# Patient Record
Sex: FEMALE | Race: WHITE | NOT HISPANIC OR LATINO
[De-identification: names, ages, dates, MRNs, and addresses within clinical notes are randomized per-mention and may not be internally consistent; named-entity substitution may affect disease eponyms.]

---

## 2022-10-29 ENCOUNTER — APPOINTMENT (OUTPATIENT)
Dept: ORTHOPEDIC SURGERY | Facility: CLINIC | Age: 52
End: 2022-10-29

## 2022-10-29 VITALS — WEIGHT: 138 LBS | BODY MASS INDEX: 23.56 KG/M2 | HEIGHT: 64 IN

## 2022-10-29 DIAGNOSIS — Z78.9 OTHER SPECIFIED HEALTH STATUS: ICD-10-CM

## 2022-10-29 DIAGNOSIS — D17.1 BENIGN LIPOMATOUS NEOPLASM OF SKIN AND SUBCUTANEOUS TISSUE OF TRUNK: ICD-10-CM

## 2022-10-29 PROBLEM — Z00.00 ENCOUNTER FOR PREVENTIVE HEALTH EXAMINATION: Status: ACTIVE | Noted: 2022-10-29

## 2022-10-29 PROCEDURE — 99203 OFFICE O/P NEW LOW 30 MIN: CPT

## 2022-10-29 NOTE — PHYSICAL EXAM
[Normal Coordination] : normal coordination [Normal DTR UE/LE] : normal DTR UE/LE  [Normal Sensation] : normal sensation [Normal Mood and Affect] : normal mood and affect [Orientated] : orientated [Able to Communicate] : able to communicate [Normal Skin] : normal skin [No Rash] : no rash [No Ulcers] : no ulcers [No Lesions] : no lesions [No obvious lymphadenopathy in areas examined] : no obvious lymphadenopathy in areas examined [Well Developed] : well developed [Well Nourished] : well nourished [Peripheral vascular exam is grossly normal] : peripheral vascular exam is grossly normal [No Respiratory Distress] : no respiratory distress [] : negative sitting straight leg raise

## 2022-10-29 NOTE — DISCUSSION/SUMMARY
[de-identified] : Patient with small, nontender subcutaneous mass to left of midline at thoracolumbar area consistent with lipoma. Recommended observation with repeat examination in 3-4 months. Counseled patient to look for any signs of pain, tenderness or enlargening of the mass which would prompt diagnostic imaging/biopsy.

## 2022-10-29 NOTE — HISTORY OF PRESENT ILLNESS
[0] : 0 [Radiating] : radiating [Tightness] : tightness [Full time] : Work status: full time [de-identified] : Patient is a 51 y/o woman who presents for evaluation of a chief complaint of a lump on her back. Notes a 3 week h/o a small bump in the middle back. Denies trauma. Denies pain or tenderness. Notices bump when sitting in a high backed chair. Reports no recent constitutional symptoms. [] : no [FreeTextEntry1] : L-spine [de-identified] : m [FreeTextEntry5] : Patient states to feel a lump on the L-spine. [de-identified] : Massage

## 2023-01-30 ENCOUNTER — APPOINTMENT (OUTPATIENT)
Dept: ORTHOPEDIC SURGERY | Facility: CLINIC | Age: 53
End: 2023-01-30

## 2023-05-02 ENCOUNTER — OFFICE (OUTPATIENT)
Dept: URBAN - METROPOLITAN AREA CLINIC 113 | Facility: CLINIC | Age: 53
Setting detail: OPHTHALMOLOGY
End: 2023-05-02
Payer: COMMERCIAL

## 2023-05-02 DIAGNOSIS — H52.7: ICD-10-CM

## 2023-05-02 DIAGNOSIS — D31.30: ICD-10-CM

## 2023-05-02 PROCEDURE — 92015 DETERMINE REFRACTIVE STATE: CPT | Performed by: STUDENT IN AN ORGANIZED HEALTH CARE EDUCATION/TRAINING PROGRAM

## 2023-05-02 PROCEDURE — 99212 OFFICE O/P EST SF 10 MIN: CPT | Performed by: STUDENT IN AN ORGANIZED HEALTH CARE EDUCATION/TRAINING PROGRAM

## 2023-05-02 ASSESSMENT — SPHEQUIV_DERIVED
OD_SPHEQUIV: 0.5
OD_SPHEQUIV: 0.5
OS_SPHEQUIV: 0.375
OD_SPHEQUIV: 0.5

## 2023-05-02 ASSESSMENT — REFRACTION_AUTOREFRACTION
OS_SPHERE: +0.50
OD_AXIS: 119
OD_SPHERE: +0.75
OD_CYLINDER: -0.50
OS_CYLINDER: -0.25
OS_AXIS: 059

## 2023-05-02 ASSESSMENT — REFRACTION_MANIFEST
OD_CYLINDER: -0.50
OS_VA1: 20/20
OS_SPHERE: +0.50
OS_VA1: 20/20
OS_SPHERE: +0.50
OD_VA2: 20/20
OD_VA1: 20/20-
OD_CYLINDER: -0.50
OS_ADD: +1.75
OD_SPHERE: +0.75
OD_AXIS: 119
OS_VA2: 20/20
OS_CYLINDER: -0.25
OD_VA2: 20/20
OD_VA1: 20/20-
OS_CYLINDER: -0.25
OS_VA2: 20/20
OD_AXIS: 119
OS_AXIS: 059
OD_SPHERE: +0.75
OS_AXIS: 059
OD_ADD: +1.75

## 2023-05-02 ASSESSMENT — CONFRONTATIONAL VISUAL FIELD TEST (CVF)
OD_FINDINGS: FULL
OS_FINDINGS: FULL

## 2023-05-02 ASSESSMENT — REFRACTION_CURRENTRX
OD_CYLINDER: -0.25
OS_SPHERE: +1.50
OD_OVR_VA: 20/
OS_OVR_VA: 20/
OD_AXIS: 116
OD_SPHERE: +1.50
OS_AXIS: 051
OS_CYLINDER: -0.25

## 2023-05-02 ASSESSMENT — AXIALLENGTH_DERIVED
OD_AL: 22.9122
OS_AL: 22.9582
OS_AL: 22.9582
OD_AL: 22.9122
OS_AL: 22.9582
OD_AL: 22.9122

## 2023-05-02 ASSESSMENT — KERATOMETRY
OD_K2POWER_DIOPTERS: 45.25
OS_K1POWER_DIOPTERS: 44.75
OD_K1POWER_DIOPTERS: 44.50
OD_AXISANGLE_DEGREES: 057
OS_K2POWER_DIOPTERS: 45.00
OS_AXISANGLE_DEGREES: 112

## 2023-05-02 ASSESSMENT — TONOMETRY
OS_IOP_MMHG: 14
OD_IOP_MMHG: 14

## 2023-05-02 ASSESSMENT — VISUAL ACUITY
OD_BCVA: 20/50
OS_BCVA: 20/30

## 2023-06-14 ENCOUNTER — OFFICE (OUTPATIENT)
Dept: URBAN - METROPOLITAN AREA CLINIC 113 | Facility: CLINIC | Age: 53
Setting detail: OPHTHALMOLOGY
End: 2023-06-14
Payer: COMMERCIAL

## 2023-06-14 DIAGNOSIS — D31.30: ICD-10-CM

## 2023-06-14 PROCEDURE — 92014 COMPRE OPH EXAM EST PT 1/>: CPT | Performed by: STUDENT IN AN ORGANIZED HEALTH CARE EDUCATION/TRAINING PROGRAM

## 2023-06-14 PROCEDURE — 92250 FUNDUS PHOTOGRAPHY W/I&R: CPT | Performed by: STUDENT IN AN ORGANIZED HEALTH CARE EDUCATION/TRAINING PROGRAM

## 2023-06-14 ASSESSMENT — REFRACTION_MANIFEST
OS_VA1: 20/20
OD_VA1: 20/20-
OD_ADD: +1.75
OD_VA2: 20/20
OD_SPHERE: +0.75
OD_AXIS: 119
OS_SPHERE: +0.50
OS_CYLINDER: -0.25
OS_CYLINDER: -0.25
OD_VA2: 20/20
OD_CYLINDER: -0.50
OS_ADD: +1.75
OD_CYLINDER: -0.50
OS_AXIS: 059
OD_SPHERE: +0.75
OS_VA2: 20/20
OD_VA1: 20/20-
OS_SPHERE: +0.50
OS_VA1: 20/20
OS_VA2: 20/20
OD_AXIS: 119
OS_AXIS: 059

## 2023-06-14 ASSESSMENT — KERATOMETRY
OD_K2POWER_DIOPTERS: 45.25
OS_AXISANGLE_DEGREES: 110
OS_K1POWER_DIOPTERS: 44.50
OD_AXISANGLE_DEGREES: 063
OS_K2POWER_DIOPTERS: 45.00
OD_K1POWER_DIOPTERS: 44.25

## 2023-06-14 ASSESSMENT — SPHEQUIV_DERIVED
OD_SPHEQUIV: 0.5
OS_SPHEQUIV: 0.375
OD_SPHEQUIV: 0.5
OS_SPHEQUIV: 0.875
OD_SPHEQUIV: 0.625
OS_SPHEQUIV: 0.375

## 2023-06-14 ASSESSMENT — VISUAL ACUITY
OD_BCVA: 20/30
OS_BCVA: 20/25

## 2023-06-14 ASSESSMENT — TONOMETRY
OS_IOP_MMHG: 16
OD_IOP_MMHG: 15

## 2023-06-14 ASSESSMENT — AXIALLENGTH_DERIVED
OD_AL: 22.9556
OD_AL: 22.9096
OS_AL: 23.0018
OD_AL: 22.9556
OS_AL: 22.8181
OS_AL: 23.0018

## 2023-06-14 ASSESSMENT — REFRACTION_AUTOREFRACTION
OD_CYLINDER: -0.25
OD_SPHERE: +0.75
OD_AXIS: 124
OS_CYLINDER: -0.25
OS_SPHERE: +1.00
OS_AXIS: 066

## 2023-06-14 ASSESSMENT — REFRACTION_CURRENTRX
OS_CYLINDER: -0.25
OD_CYLINDER: -0.25
OD_OVR_VA: 20/
OD_AXIS: 116
OS_AXIS: 051
OS_SPHERE: +1.50
OS_OVR_VA: 20/
OD_SPHERE: +1.50

## 2023-06-14 ASSESSMENT — CONFRONTATIONAL VISUAL FIELD TEST (CVF)
OS_FINDINGS: FULL
OD_FINDINGS: FULL

## 2025-01-13 ENCOUNTER — OFFICE (OUTPATIENT)
Dept: URBAN - METROPOLITAN AREA CLINIC 113 | Facility: CLINIC | Age: 55
Setting detail: OPHTHALMOLOGY
End: 2025-01-13
Payer: COMMERCIAL

## 2025-01-13 DIAGNOSIS — H43.393: ICD-10-CM

## 2025-01-13 DIAGNOSIS — D31.30: ICD-10-CM

## 2025-01-13 DIAGNOSIS — H52.7: ICD-10-CM

## 2025-01-13 DIAGNOSIS — H35.3121: ICD-10-CM

## 2025-01-13 PROCEDURE — 92014 COMPRE OPH EXAM EST PT 1/>: CPT | Performed by: STUDENT IN AN ORGANIZED HEALTH CARE EDUCATION/TRAINING PROGRAM

## 2025-01-13 PROCEDURE — 92015 DETERMINE REFRACTIVE STATE: CPT | Performed by: STUDENT IN AN ORGANIZED HEALTH CARE EDUCATION/TRAINING PROGRAM

## 2025-01-13 PROCEDURE — 92250 FUNDUS PHOTOGRAPHY W/I&R: CPT | Performed by: STUDENT IN AN ORGANIZED HEALTH CARE EDUCATION/TRAINING PROGRAM

## 2025-01-13 ASSESSMENT — REFRACTION_CURRENTRX
OD_SPHERE: +2.50
OS_AXIS: 064
OS_SPHERE: +2.50
OS_OVR_VA: 20/
OD_VPRISM_DIRECTION: SV
OD_AXIS: 107
OD_CYLINDER: -0.25
OS_CYLINDER: -0.25
OD_OVR_VA: 20/
OS_VPRISM_DIRECTION: SV
OD_SPHERE: +1.50
OS_SPHERE: +1.50
OD_CYLINDER: -0.25
OS_CYLINDER: -0.25
OS_OVR_VA: 20/
OS_AXIS: 051
OD_OVR_VA: 20/
OD_AXIS: 116

## 2025-01-13 ASSESSMENT — KERATOMETRY
OS_K1POWER_DIOPTERS: 44.50
OD_AXISANGLE_DEGREES: 061
OD_K1POWER_DIOPTERS: 44.50
OS_K2POWER_DIOPTERS: 45.00
OS_AXISANGLE_DEGREES: 125
OD_K2POWER_DIOPTERS: 45.25

## 2025-01-13 ASSESSMENT — REFRACTION_MANIFEST
OS_VA1: 20/20
OS_VA2: 20/20
OS_SPHERE: +0.50
OD_SPHERE: +1.00
OS_VA1: 20/20
OS_ADD: +1.75
OD_AXIS: 119
OD_CYLINDER: -0.50
OD_VA2: 20/20
OD_VA2: 20/20
OS_AXIS: 059
OD_AXIS: 119
OS_ADD: +1.75
OD_CYLINDER: -0.50
OS_AXIS: 075
OD_ADD: +1.75
OD_ADD: +1.75
OS_CYLINDER: -0.25
OD_SPHERE: +0.75
OD_VA1: 20/20
OD_VA1: 20/20-
OS_CYLINDER: -0.25
OS_SPHERE: +1.00
OS_VA2: 20/20

## 2025-01-13 ASSESSMENT — REFRACTION_AUTOREFRACTION
OD_AXIS: 119
OS_CYLINDER: -0.25
OS_SPHERE: +1.00
OS_AXIS: 075
OD_SPHERE: +1.00
OD_CYLINDER: -0.50

## 2025-01-13 ASSESSMENT — CONFRONTATIONAL VISUAL FIELD TEST (CVF)
OD_FINDINGS: FULL
OS_FINDINGS: FULL

## 2025-01-13 ASSESSMENT — TONOMETRY
OS_IOP_MMHG: 15
OD_IOP_MMHG: 16

## 2025-01-13 ASSESSMENT — VISUAL ACUITY
OD_BCVA: 20/30
OS_BCVA: 20/25

## 2025-07-21 ENCOUNTER — OFFICE (OUTPATIENT)
Dept: URBAN - METROPOLITAN AREA CLINIC 113 | Facility: CLINIC | Age: 55
Setting detail: OPHTHALMOLOGY
End: 2025-07-21
Payer: COMMERCIAL

## 2025-07-21 DIAGNOSIS — H35.3121: ICD-10-CM

## 2025-07-21 PROCEDURE — 99213 OFFICE O/P EST LOW 20 MIN: CPT | Performed by: STUDENT IN AN ORGANIZED HEALTH CARE EDUCATION/TRAINING PROGRAM

## 2025-07-21 PROCEDURE — 92134 CPTRZ OPH DX IMG PST SGM RTA: CPT | Performed by: STUDENT IN AN ORGANIZED HEALTH CARE EDUCATION/TRAINING PROGRAM

## 2025-07-21 ASSESSMENT — REFRACTION_CURRENTRX
OD_CYLINDER: -0.25
OS_VPRISM_DIRECTION: SV
OS_SPHERE: +1.50
OD_SPHERE: +1.50
OD_VPRISM_DIRECTION: SV
OS_OVR_VA: 20/
OS_CYLINDER: -0.25
OS_CYLINDER: -0.25
OS_OVR_VA: 20/
OD_AXIS: 107
OD_SPHERE: +2.50
OD_CYLINDER: -0.25
OD_OVR_VA: 20/
OS_SPHERE: +2.50
OS_AXIS: 064
OD_AXIS: 116
OD_OVR_VA: 20/
OS_AXIS: 051

## 2025-07-21 ASSESSMENT — REFRACTION_MANIFEST
OD_ADD: +1.75
OD_CYLINDER: -0.50
OD_CYLINDER: -0.50
OD_SPHERE: +0.75
OS_ADD: +1.75
OD_VA1: 20/20-
OD_SPHERE: +1.00
OS_VA1: 20/20
OS_AXIS: 075
OS_CYLINDER: -0.25
OD_VA2: 20/20
OS_VA1: 20/20
OS_SPHERE: +0.50
OS_VA2: 20/20
OS_VA2: 20/20
OS_CYLINDER: -0.25
OD_ADD: +1.75
OD_VA1: 20/20
OS_SPHERE: +1.00
OS_ADD: +1.75
OD_AXIS: 119
OD_VA2: 20/20
OD_AXIS: 119
OS_AXIS: 059

## 2025-07-21 ASSESSMENT — KERATOMETRY
OS_K1POWER_DIOPTERS: 44.75
OD_AXISANGLE_DEGREES: 057
OS_AXISANGLE_DEGREES: 111
OD_K1POWER_DIOPTERS: 44.50
OS_K2POWER_DIOPTERS: 45.00
OD_K2POWER_DIOPTERS: 45.25

## 2025-07-21 ASSESSMENT — REFRACTION_AUTOREFRACTION
OS_SPHERE: +0.75
OS_AXIS: 045
OD_SPHERE: +1.00
OS_CYLINDER: -0.25
OD_AXIS: 118
OD_CYLINDER: -0.50

## 2025-07-21 ASSESSMENT — CONFRONTATIONAL VISUAL FIELD TEST (CVF)
OD_FINDINGS: FULL
OS_FINDINGS: FULL

## 2025-07-21 ASSESSMENT — TONOMETRY
OS_IOP_MMHG: 17
OD_IOP_MMHG: 18

## 2025-07-21 ASSESSMENT — VISUAL ACUITY
OS_BCVA: 20/25
OD_BCVA: 20/30